# Patient Record
(demographics unavailable — no encounter records)

---

## 2017-12-29 NOTE — OP
DATE OF PROCEDURE:  12/28/2017

 

PREOPERATIVE DIAGNOSIS:  Incisional periumbilical ventral hernia.

 

POSTOPERATIVE DIAGNOSIS:  Incisional periumbilical ventral hernia.

 

OPERATION PERFORMED:  Laparoscopic repair of incisional periumbilical ventral hernia, utilizing an 8 
cm Ventralex mesh patch.

 

SURGEON:  Mohinder Gutierrez M.D.

 

ANESTHESIA:  General endotracheal.

 

INDICATIONS:  The patient is a 39-year-old white female.  She had previously had an umbilical hernia 
repaired with mesh.  She then had a laparoscopic procedure, at which time an antrectomy with Christiana-en-
Y gastrojejunostomy was performed.  She had noted several years after this that she developed a bulge
 just superior and to the right of her umbilicus at the site of her prior umbilical hernia repair.  S
he was taken to the operating room at this time for repair of this.  It has become progressively pain
ful recently.

 

DESCRIPTION OF OPERATION:  Informed consent was obtained.  The patient was taken to the operating sarah
m where general endotracheal anesthesia was obtained with the patient in supine position.  Abdomen wa
s prepped with ChloraPrep and draped in sterile fashion.  Local anesthetic was infiltrated and a 5 mm
 left mid abdominal incision was created through which a Veress needle was passed into the peritoneal
 cavity and pneumoperitoneum established using carbon dioxide up to a pressure of 15 mmHg.  A 5 mm tr
ocar port was passed through this same incision.  Laparoscopic camera was passed through this port.  
Under direct vision, 2 additional ports were placed including a 15 mm left upper quadrant port and a 
5 mm left lower quadrant port.

 

Attention was turned to the anterior abdominal wall.  There were essentially no adhesions to the ante
rior abdominal wall and the hernia was immediately visualized.  There was mesh at this location.  The
 hernia appeared to be towards the superior aspect of the mesh and the mesh was rolled up into the de
fect.  I initially performed a preperitoneal dissection, removing all of the surrounding fat from the
 underside of the fascia.  I removed some of the mesh in doing so.  The fascial edges were then caref
ully defined.  The defect was about 3 cm transversely x 2 cm vertically.  This defect was marked exte
rnally on the skin.  Hemostasis was meticulous using electrocautery.

 

The defect was closed percutaneously using 3 sutures of 0 Ethibond that were placed with a Neema nee
dle.  With the tissue fully approximated, an 8 cm Ventralex mesh patch was obtained.  Four quadrant s
utures of 0 Ethibond were placed and the mesh was moistened and passed into the abdominal cavity.  Th
e 4 stay sutures were withdrawn through the anterior abdominal wall using a GraNee needle and each wa
s secured so as to create a 4 quadrant transfascial fixation.  There was excellent coverage over the 
defect.  The patch was then secured around the circumference using the secure strap device.  Several 
straps were placed along the internal aspect of the patch as well.

 

The fascia at the left upper quadrant port site was closed with 0 Vicryl suture using a GraNee needle
.  There was more bleeding than typical from the mid abdominal port and this was also closed with an 
0 Vicryl suture.  All ports and instruments were removed under direct vision.  Pneumoperitoneum was c
arefully evacuated.  Quarter percent Marcaine with epinephrine was infiltrated in each port site and 
skin edges approximated with 4-0 Monocryl subcuticular suture.  Dermabond was placed externally.  The
re were no complications.  The patient tolerated the procedure well and was taken to recovery room in
 stable condition.

## 2018-11-05 NOTE — CT
CT ABDOMEN AND PELVIS WITH IV CONTRAST:

 

HISTORY:

Abdominal pain.  Cramping.

 

COMPARISON:

08/10/2018

 

FINDINGS:

The lung bases are clear.  The liver, spleen, kidneys, adrenal glands, and pancreas are unremarkable.
  There is a contraceptive device within the uterus.  The urinary bladder is decompressed.

 

Lack of oral contrast limits evaluation of the bowel.  Postoperative changes of the stomach are again
 demonstrated.  The stomach is now markedly distended and enlarged.  It contains fluid, gas, and a sm
all amount of particulate matter.  It measures up to 9.3 cm in length.

 

Follicles arise from the ovaries.

 

IMPRESSION:

Given the increasing distention of the enlarged stomach, gastric outlet obstruction must be of primar
y concern.

 

Other findings are stable.

 

POS: VEE

## 2018-11-06 NOTE — CON
DATE OF CONSULTATION:  11/05/2018

 

CHIEF COMPLAINT:  Nausea, vomiting, abdominal pain.

 

HISTORY OF PRESENT ILLNESS:  Ms. Mak is a 40-year-old woman who originally presented to GI clinic b
[facility] in 01/2014 with abdominal pain and nausea and vomiting.  She underwent upper endoscopy at that [facility] that showed a pyloric ulcer and pyloric stenosis.  Balloon dilation of the pyloric stenosis was pe
rformed and she did well for several months after that; however, she presented again in 01/2015 with 
nausea and vomiting and gastric outlet obstruction.  She underwent balloon dilation and then repeat b
alloon dilation; however, at this point, the stricture was noted to be very long and a large amount o
f retained vegetable matter in the stomach and the stricture tightened immediately back up after ball
oon dilation such that even following serial dilation her stomach did not empty.  She ultimately unde
rwent antrectomy with Christiana-en-Y gastrojejunostomy and vagotomy in 01/2015.  I did not see her again a
fter that until she came back to the office in 07/2018 with epigastric pain, nausea, and vomiting.  I
n the meantime, she had been diagnosed with an autoimmune disorder and was treated with steroids and 
Celebrex.  She saw Dr. Jonathan Pan over in the [place]. She was changed to leflunomide and hydroxyc
hloroquine for treatment of erythema nodosum and rheumatoid arthritis.  Her symptoms significantly im
proved with the change in her medications from the standpoint of joint pain and erythema nodosum.  In
 the meantime, I performed EGD and colonoscopy in 08/01/2018.  The upper endoscopy showed ulceration 
at the gastrojejunostomy that was inflamed and the scope could not be passed through it.  Colonoscopy
 to the terminal ileum was normal.  CT was done with contrast to better define the anatomy at the zan
stomosis and the contrast did pass through without problems.  I performed follow up EGD on 08/27/2018
 and again the gastric bypass was noted with a moderate size gastric pouch with food debris.  There w
as marked ulceration in the jejunum on the other side of the gastrojejunostomy.  The site was strictu
red, but the endoscope could be placed through to the more distal small bowel that appeared completel
y normal.  She was given again proton pump inhibitor with pantoprazole 40 mg twice daily.  She was gi
sendy a trial of Carafate as well; however, she reports that the Carafate only made her feel worse.  Sally pereyra came back to the clinic for followup and she was placed on a liquid diet.  Gastrin level was checke
d and was normal at less than 10.  She does not smoke and has not been a smoker.  She has been on no 
NSAIDs for months.  She tested negative for H. pylori previously.  Biopsies from the anastomosis show
ed ulceration with no specific neoplastic process and no H. pylori and no clear inflammatory bowel di
sease.

 

Ms. Mak presented to the emergency room this afternoon with nausea and vomiting and left upper quad
rant severe pulling or pressure type pain that radiates up towards her substernal region.  This pain 
started on Thursday and has been persistent now and she has been unable to keep anything down orally.


 

PAST MEDICAL HISTORY:

1.  Chronic gastric ulcer, ultimately treated surgically, now with recurrent ulcer at the gastrojejun
al anastomosis with gastric outlet obstruction.

2.  Rheumatoid arthritis.

3.  Erythema nodosum.

4.  Hypothyroidism.

 

PAST SURGICAL HISTORY:  Appendectomy, hernia repair, antrectomy with vagotomy and gastrojejunostomy R
oux-en-Y.

 

ALLERGIES:  SULFA, PENICILLIN, LATEX.

 

FAMILY HISTORY:  Negative for GI malignancy.

 

SOCIAL HISTORY:  Rare alcohol, no smoking, no drugs.

 

MEDICATIONS:  Prior to admission leflunomide, levothyroxine, pantoprazole 40 mg twice daily, hydroxyc
hloroquine, escitalopram, occasional acetaminophen for pain but no NSAIDs.

 

REVIEW OF SYSTEMS:  Negative x10 systems reviewed except as stated in history of present illness.

 

PHYSICAL EXAMINATION:

GENERAL:  She is in no acute distress, alert and oriented x3.

HEENT:  Eyes have no scleral icterus.

OROPHARYNX:  Clear, without lesions.

NECK:  No cervical or supraclavicular lymphadenopathy.

NEUROLOGIC:  Cranial nerves are grossly intact.

LUNGS:  Clear to auscultation bilaterally.

HEART:  Regular rate and rhythm without murmur.

ABDOMEN:  Soft, tender in the epigastric region without guarding.  Bowel sounds are present.

EXTREMITIES:  No lower extremity edema.

 

IMAGING:  She had a CT scan of the abdomen and pelvis in the emergency room that showed a distended s
tomach with evidence of gastric outlet obstruction.

 

LABORATORY AND X-RAY FINDINGS:  White blood cell count 5.3, hemoglobin is 17.3 up from baseline betwe
en 14 and 12, platelets 390, creatinine 0.85, albumin 4.5.

 

IMPRESSION:

1.  Gastric outlet obstruction secondary to chronic ulcer at the gastrojejunal anastomosis.  She prev
iously had antrectomy with vagotomy and gastrojejunostomy with Christiana-en-Y due to refractory pyloric ch
kp ulcer with gastric outlet obstruction.  She has tested negative for H. pylori.  She has been of
f NSAIDs.  She does not smoke and has not been a smoker.  Her gastrin level was normal.  She presents
 now with severe dehydration and distended stomach consistent with gastric outlet obstruction.  She h
as failed to respond to twice daily high dose proton pump inhibitor and Carafate and liquid diet.  At
 this point, I think she will require surgical revision of the gastrojejunostomy anastomosis.

2.  History of rheumatoid arthritis and erythema nodosum.  She is on leflunomide and hydroxychloroqui
ne.  Given the chronic ulcers that will not heal with no other source identified.  Inflammatory bowel
 disease of the stomach and proximal small bowel is considered as a possibility.  No obvious granulom
as were reported by previous histopathology specimens.  She had a recent colonoscopy to the terminal 
ileum which was normal.  She has had no evidence of disease distal to the pylorus previously and now 
to the anastomosis.  Certainly, she has an area high risk for anastomotic ulceration; however, still 
consider inflammatory bowel disease as a possibility.  I will send an IBD diagnostic serology to look
 for other evidence of inflammatory bowel disease; however, this will not rule in or rule out Crohn's
 as a diagnosis.  If she does have findings suggestive of inflammatory bowel disease by Serology; how
ever, I would consider treating with an anti-TNF since she is already considered this for treatment o
f her rheumatoid arthritis and erythema nodosum anyway.  She did have a flare of joint pain just last
 week, even on her current immunosuppressive regimen.

 

RECOMMENDATIONS:

1.  Place NG tube.

2.  IV fluids.

3.  Consultation with General Surgery.  I discussed her case with Dr. Gutierrez and after decompression
 of her stomach then the next step will likely be surgical revision of her gastrojejunostomy.

4.  I will send serology for IBD diagnostic markers.

## 2018-11-06 NOTE — HP
PRIMARY CARE PHYSICIAN:  Dr. Kerwin Gann.

 

REASON FOR ADMISSION:  Abdominal pain and gastric outlet obstruction.

 

HISTORY OF PRESENT ILLNESS:  This is a 40-year-old female, who has a previous history of gastric bypa
ss surgery with Christiana-en-Y; operation was done by Dr. Gutierrez about 5 years ago.  Subsequently, her co
deoe was complicated one time with gastric outlet obstruction, required surgery.  Patient has a long 
history of nausea, vomiting, abdominal pain for about 6 months, but initially symptoms were intermitt
ent and patient was trying to manage with dietary modification.  For the last 3 weeks, patient's symp
toms have gradually progressed and she was instructed to be on clear liquid diet, and now a days even
 with liquid diet patient is getting abdominal pain, which is epigastric in location, 5/10 in intensi
ty, associated with acid reflux as well as nausea and sometimes vomiting.  Patient has lost almost 30
 pounds weight over the last several years and 12 pound in the last 3 weeks.  She denies any melena o
r hematochezia.  She denies any fever or chills.  She denies any UTI symptoms.  She denies any headac
he or dizziness, but she is feeling weak and exhausted.

 

With this presentation, she came to emergency room yesterday and she had CT abdomen and pelvis, which
 showed gastric distention and gastric outlet obstruction was a concern.  This patient was already ev
aluated by Dr. Bello as well as Dr. Gutierrez and they recommended to put her NG tube and keep her n.p.
o. with a clear ice chips only.  Overnight, the patient did well.  She still has some nausea and some
 vague discomfort in her epigastric region.  She denies any chest pain.  She denies any shortness of 
breath.

 

PAST MEDICAL HISTORY:  Chronic gastric ulcer, treated with surgery with gastric bypass and Christiana-en-Y;
 history of gastrojejunal anastomosis with a gastric outlet obstruction, required surgery; rheumatoid
 arthritis; erythema nodosum; hypothyroidism; migraine.

 

PAST SURGICAL HISTORY:  Appendicectomy, hernia repair, antrectomy with vagotomy and gastrojejunostomy
 with Christiana-en-Y, tonsillectomy, bladder sling repair, bunionectomy.

 

PAST PSYCHIATRIC HISTORY:  Anxiety and depression.

 

ALLERGIES:  SULFA DRUGS, PENICILLIN, and LATEX.

 

FAMILY HISTORY:  Patient denies any family history of coronary artery disease, stroke, or cancer.

 

SOCIAL HISTORY:  Patient is  and lives at home with her .  No history of tobacco, alcoh
ol, or illicit drug abuse.

 

MEDICATIONS:  Lexapro 20 mg p.o. daily, Plaquenil 200 mg p.o. at bedtime, leflunomide 10 mg as direct
ed, levothyroxine 25 mcg p.o. daily, Protonix 40 mg p.o. b.i.d., Carafate a.c. and at bedtime.

 

REVIEW OF SYSTEMS:  The following complete review of systems was negative, unless otherwise mentioned
 in the HPI or below:  Constitutional:  Weight loss or gain, ability to conduct usual activities.  Sk
in:  Rash, itching.  Eyes:  Double vision, pain.  ENT/Mouth:  Nose bleeding, neck stiffness, pain, te
nderness.  Cardiovascular:  Palpitations, dyspnea on exertion, orthopnea.  Respiratory:  Shortness of
 breath, wheezing, cough, hemoptysis, fever, or night sweats.  Gastrointestinal:  Poor appetite, abdo
anil pain, heartburn, nausea, vomiting, constipation, or diarrhea.  Genitourinary:  Urgency, frequen
cy, dysuria, nocturia.  Musculoskeletal:  Pain, swelling.  Neurologic/Psychiatric:  Anxiety, depressi
on.  Allergy/Immunologic:  Skin rash, bleeding tendency.  Please see my HPI for pertinent positive an
d negative.  All other review of systems reviewed and negative except as mentioned in the HPI.

 

EMERGENCY ROOM COURSE:  Patient was treated with morphine, Phenergan, and IV fluid, Zofran, and Naomi
nix.

 

PHYSICAL EXAMINATION:

VITAL SIGNS:  On arrival, blood pressure 108/88, pulse 122, respiratory rate of 17, temperature 98.8,
 saturation 98% on room air.

GENERAL:  Patient is currently alert, awake, in no obvious acute distress.

HEENT:  Head:  Normocephalic, atraumatic.  Eyes:  Pupils round, reactive to light.  Extraocular muscl
e intact.  ENT:  Oropharynx within normal limits.  Moist mucous membranes, no oral lesion.  No pharyn
geal erythema, no exudate.  She has NG tube in place with low intermittent suction.

NECK:  Supple, no JVD, no thyromegaly, no carotid bruit.

LUNGS:  Clear to auscultation without any rhonchi or rales.

CARDIAC:  S1 and S2 regular, tachycardia, no murmur, no gallop, no rub.

ABDOMEN:  Soft.  Epigastric discomfort noted.  No peritoneal sign, no guarding, no rigidity, no rebou
nd.

BACK EXAMINATION:  Unremarkable, no CVA tenderness.

EXTREMITIES:  Upper extremities, passive movement of all joints are normal.  Lower extremities, no ed
ema.  Good distal pulsation, no calf tenderness.

SKIN:  No skin rash.

HEMATOLOGICAL SYSTEM:  No lymphadenopathy.

PSYCHIATRIC:  Normal affect.

NEUROLOGIC:  Nonfocal examination.

 

SIGNIFICANT LABORATORY DATA:  EKG showing sinus tachycardia.  CBC:  WBC 5.3, hemoglobin 17.3, platele
ts 390.  BMP:  Sodium 140, potassium 3.5, chloride 98, carbon dioxide 32, anion gap 14, BUN 25, creat
inine 0.85, glucose 104, calcium 9.9.  LFTs:  AST 11, ALT 7, alkaline phosphatase 98, albumin 4.5, li
pase 13.  Urinalysis unremarkable.  Pregnancy test negative.  CT of the abdomen and pelvis consistent
 with a gastric outlet obstruction and gastric distention.  X-ray abdomen showing NG tube in place.

 

ASSESSMENT AND PLAN:

1.  Nausea, vomiting, abdominal pain in epigastric region due to gastric outlet obstruction.

2.  Dehydration.

3.  Metabolic alkalosis due to recurrent nausea and vomiting.

4.  Anxiety and depression.

5.  Rheumatoid arthritis.

6.  Hypothyroidism.

7.  History of chronic peptic ulcer disease, required a gastrojejunostomy with Christiana-en-Y surgery.

 

PLAN:  Admission to medical floor.  Gastroenterology consult and general surgery consult.  We will co
ntinue with Protonix 40 mg IV b.i.d.  We will continue with dextrose with half normal saline NS at 12
5 mL per hour for hydration.  Deep venous thrombosis prophylaxis with Lovenox 40 mg subcutaneously da
jim.  Gastrointestinal prophylaxis with Protonix.

 

CODE STATUS:  The patient is FULL CODE.  Patient's  is surrogate decision maker.

 

Disposition plan based on clinical course and consultant recommendation.

 

At this point, the patient has NG tube with low intermittent suction on ice chips only.  Depending up
on clinical course, we will advance diet.  She may need a surgical evaluation versus endoscopic evalu
ation.

 

The patient will stay in hospital more than 2 midnights.  Plan of care discussed with the patient in 
detail.

## 2018-11-06 NOTE — RAD
ABDOMEN ONE VIEW:

 

History: 

40-year-old female with history of follow up gastric outlet obstruction. 

 

FINDINGS: 

Extensive post-operative changes are noted. There is some gas within a somewhat dilated stomach. An N
G tube has been placed. The tip extends into the stomach. The sidehole is at the GE junction region a
nd this could be advanced somewhat to completely allow the sidehole to enter the stomach. There is le
ss distention than on the CT of 11-5-18. 

 

IMPRESSION: 

NG tube in place with some persistent dilatation and distention of the stomach, the sidehole is at th
e GE junction region and the NG tube could be advanced so that it more completely enters the stomach.
 Extensive post-operative changes. Continue short term follow up. 

 

POS: VEE

## 2018-11-07 NOTE — PDOC.PN
- Subjective


Encounter Start Date: 11/07/18


Encounter Start Time: 08:30





Patient seen and examined. No new complaints. No overnight events





today NG tube came out





- Objective


Resuscitation Status: 


 











Resuscitation Status           FULL:Full Resuscitation














MAR Reviewed: Yes


Vital Signs & Weight: 


 Vital Signs (12 hours)











  Temp Pulse Resp BP Pulse Ox


 


 11/07/18 08:48  98.1 F  95  16  109/63  99


 


 11/07/18 05:00  98.3 F  84  18  121/68 


 


 11/07/18 00:59  98.1 F  87  18  105/58 L 








 Weight











Admit Weight                   126 lb 12.253 oz


 


Weight                         126 lb 12.253 oz














I&O: 


 











 11/06/18 11/07/18 11/08/18





 06:59 06:59 06:59


 


Intake Total 1192 1228 


 


Output Total 375 1200 


 


Balance 817 28 











Result Diagrams: 


 11/07/18 05:31





 11/07/18 05:31





Phys Exam





- Physical Examination


Constitutional: NAD


HEENT: PERRLA, moist MMs, sclera anicteric


Neck: no JVD, supple


Respiratory: no wheezing, no rales, no rhonchi


Cardiovascular: RRR, no significant murmur, no rub


Gastrointestinal: soft, no distention, positive bowel sounds


epigastric discomfort


Musculoskeletal: no edema, pulses present


Neurological: non-focal, normal sensation, moves all 4 limbs


Psychiatric: normal affect, A&O x 3


Skin: no rash, normal turgor





Dx/Plan


(1) Dehydration


Code(s): E86.0 - DEHYDRATION   Status: Acute   





(2) Epigastric abdominal pain


Code(s): R10.13 - EPIGASTRIC PAIN   Status: Acute   





(3) Gastric distention


Code(s): K31.89 - OTHER DISEASES OF STOMACH AND DUODENUM   Status: Acute   





(4) Gastric outlet obstruction


Code(s): K31.1 - ADULT HYPERTROPHIC PYLORIC STENOSIS   Status: Acute   





(5) Metabolic alkalosis


Code(s): E87.3 - ALKALOSIS   Status: Acute   





(6) Nausea & vomiting


Code(s): R11.2 - NAUSEA WITH VOMITING, UNSPECIFIED   Status: Acute   





(7) Anxiety and depression


Code(s): F41.9 - ANXIETY DISORDER, UNSPECIFIED; F32.9 - MAJOR DEPRESSIVE 

DISORDER, SINGLE EPISODE, UNSPECIFIED   Status: Chronic   





(8) Chronic low back pain


Code(s): M54.5 - LOW BACK PAIN; G89.29 - OTHER CHRONIC PAIN   Status: Chronic   





(9) H/O gastric bypass


Code(s): Z98.84 - BARIATRIC SURGERY STATUS   Status: Chronic   





(10) H/O peptic ulcer


Code(s): Z87.11 - PERSONAL HISTORY OF PEPTIC ULCER DISEASE   Status: Chronic   





(11) Hypothyroidism


Code(s): E03.9 - HYPOTHYROIDISM, UNSPECIFIED   Status: Chronic   





(12) Rheumatoid arthritis


Code(s): M06.9 - RHEUMATOID ARTHRITIS, UNSPECIFIED   Status: Chronic   





- Plan


cont current plan of care





* she will need repeat NG tube placement and then LIS


* tomorrow she is planned for surgery


* continue IVF


* medication reviewed as below


* symptomatic treatment.








Review of Systems





- Review of Systems


ENT: negative: Ear Pain, Ear Discharge, Nose Pain, Nose Discharge, Nose 

Congestion, Mouth Pain, Mouth Swelling, Throat Pain, Throat Swelling, Other


Respiratory: negative: Cough, Dry, Shortness of Breath, Hemoptysis, SOB with 

Excertion, Pleuritic Pain, Sputum, Wheezing


Cardiovascular: negative: chest pain, palpitations, orthopnea, paroxysmal 

nocturnal dyspnea, edema, light headedness, other


Gastrointestinal: Nausea, Abdominal Pain.  negative: Vomiting, Diarrhea, 

Constipation, Melena, Hematochezia, Other


Genitourinary: negative: Dysuria, Frequency, Incontinence, Hematuria, Retention

, Other


Musculoskeletal: negative: Neck Pain, Shoulder Pain, Arm Pain, Back Pain, Hand 

Pain, Leg Pain, Foot Pain, Other


Skin: negative: Rash, Lesions, David, Bruising, Other





- Medications/Allergies


Allergies/Adverse Reactions: 


 Allergies











Allergy/AdvReac Type Severity Reaction Status Date / Time


 


adhesive tape Allergy   Verified 12/27/17 10:32


 


Penicillins Allergy   Verified 12/27/17 10:32


 


Sulfa (Sulfonamide Allergy   Verified 12/27/17 10:32





Antibiotics)     











Medications: 


 Current Medications





Acetaminophen (Tylenol)  650 mg SC Q4H PRN


   PRN Reason: Headache/Fever/Mild Pain (1-3)


Artificial Tears (Tears Naturale)  2 drop EA EYE PRN PRN


   PRN Reason: Dry Eyes


Bisacodyl (Dulcolax)  10 mg SC DAILYPRN PRN


   PRN Reason: Constipation


Enoxaparin Sodium (Lovenox)  40 mg SC 0900 [facility]


   Last Admin: 11/06/18 11:49 Dose:  40 mg


Hydralazine HCl (Apresoline)  10 mg SLOW IVP Q4H PRN


   PRN Reason: SBP Greater Than 170


Potassium Chloride/Dextrose/Sod Cl (D5 1/2 Ns W/20 Meq Kcl)  1,000 mls @ 125 mls

/hr IV .Q8H [facility]


   Last Admin: 11/07/18 06:04 Dose:  1,000 mls


Acetaminophen 650 mg/ Device  65 mls @ 260 mls/hr IVPB Q6H PRN


   PRN Reason: Fever/Mild Pain


   Stop: 11/07/18 22:56


Mineral Oil/White Petrolatum (Eucerin Cream)  0 gm TOP BIDPRN PRN


   PRN Reason: Dry Skin


Morphine Sulfate (Morphine)  2 mg SLOW IVP Q4H PRN


   PRN Reason: Moderate Pain (4-6)


   Last Admin: 11/06/18 17:52 Dose:  2 mg


Ondansetron HCl (Zofran)  4 mg IVP Q6H PRN


   PRN Reason: Nausea/Vomiting


   Last Admin: 11/06/18 22:49 Dose:  4 mg


Pantoprazole Sodium (Protonix)  40 mg IVP Q12HR [facility]


   Last Admin: 11/06/18 20:33 Dose:  40 mg


Sodium Chloride (Flush - Normal Saline)  10 ml IVF Q12HR [facility]


   Last Admin: 11/06/18 20:33 Dose:  10 ml


Sodium Chloride (Flush - Normal Saline)  10 ml IVF PRN PRN


   PRN Reason: Saline Flush


Sodium Chloride (Ocean Nasal Spray 0.65%)  0 ml EA NARE QIDPRN PRN


   PRN Reason: Nasal Congestion


Throat Lozenges (Cepastat Lozenges)  1 yusra PO Q2H PRN


   PRN Reason: Sore Throat

## 2018-11-07 NOTE — CON
DATE OF CONSULTATION:  11/06/2018

 

CHIEF COMPLAINT:  Gastric outlet obstruction with nausea and vomiting.

 

HISTORY OF PRESENT ILLNESS:  The patient is a 40-year-old pleasant white female.  In 01/2015, I perfo
rmed a laparoscopic distal gastrectomy with Christiana-en-Y gastrojejunostomy.  I also performed a truncal 
vagotomy at that time.  She had gastric outlet obstruction secondary to pyloric scarring from peptic 
ulcer disease.  She did well after the surgery and has had no subsequent gastric problems for couple 
of years.  She had a flare up of an autoimmune disease for which she was placed on significant doses 
of steroids and nonsteroidal medications.  At that point, she began having recurrent problems with he
r stomach.  She and her  note that over the past 6 months that she has had intermittent proble
ms with nausea, vomiting, and abdominal pain.  She has undergone few endoscopies per Dr. Bello.  She 
is noted to have recurrent problems with ulceration, stenosis at her gastrojejunostomy.  Gastrin leve
ls were checked and found to be essentially normal.

 

She presented to the emergency room yesterday with worsened problems with pain and nausea and vomitin
g.  She and her  both note that her quality of life over the past several months has been very
 poor.  CT scan obtained in the emergency room document, substantial gastric distention with no other
 intra-abdominal abnormality.

 

PAST MEDICAL HISTORY:

1.  History of gastric outlet obstruction.

2.  Rheumatoid arthritis.

3.  Erythema nodosum.

4.  Hypothyroidism.

5.  History of migraine headaches.

 

PAST SURGICAL HISTORY:

1.  Appendectomy.

2.  Umbilical hernia repair

3.  Antrectomy with vagotomy and gastrojejunostomy in 2015.

4.  Tonsillectomy.

5.  Bladder sling repair.

6.  Bunionectomy.

7.  Laparoscopic ventral hernia repair that I performed last year.

 

ALLERGIES:  SULFA DRUGS, PENICILLIN, LATEX.

 

PERSONAL AND SOCIAL HISTORY:  She is  with 2 children.  She is a teacher.  She lives in [facility].  She denies tobacco or alcohol use.

 

MEDICATIONS:  Lexapro, Plaquenil, leflunomide, levothyroxine, Protonix, Carafate.

 

REVIEW OF SYSTEMS:  Otherwise, unremarkable.

 

FAMILY HISTORY:  Noncontributory.

 

PHYSICAL EXAMINATION:

VITAL SIGNS:  She is afebrile with tachycardia upon arrival with a heart rate of 122.  Her tachycardi
a is largely resolved with heart rate in the 80s.  Blood pressure is within normal limits.

GENERAL:  She is a well-developed, well-nourished, thin white female resting in bed in no acute distr
ess.  She is alert and oriented x3.

HEAD, EYES, EARS, NOSE AND THROAT:  Unremarkable.

NECK:  Supple, without mass or tenderness.

LUNGS:  Clear to auscultation throughout.

CARDIAC:  Regular rate and rhythm without murmur.

ABDOMEN:  Soft, nontender, nondistended with well healed incisions from prior surgery.

EXTREMITIES:  Unremarkable.

 

LABORATORY DATA:  CBC reveals white blood cell count of 5.3, hemoglobin of 17.3.  Chemistry profile r
eveals no significant electrolyte abnormality.  Her CO2 is little elevated at 32, BUN and creatinine 
are normal.  Albumin level is 4.5.

 

ASSESSMENT:  The patient with substantial gastric outlet obstruction with a very distended stomach.  
At last surgery, her stomach was very thickened secondary to the chronic obstruction.  For now, there
fore, recommend placement of nasogastric tube allow the stomach to decompress for about 48 hours.  Af
ter 48 hours of decompression, I believe her stomach will be well suited to repeat operation.  I will
 plan on revising her gastrojejunostomy laparoscopic cholecystectomy.  I discussed this operation wit
h the patient and her .  They understand and agree to proceed with surgery.

## 2018-11-07 NOTE — PRG
DATE OF SERVICE:  11/07/2018

 

SUBJECTIVE:  Ms. Mak is uncomfortable from her NG tube.  She did have a few 100 mL out from her NG 
tube, but most of this was fluid used to flush the tube.  She has not had ongoing pain in her abdomen
 or vomiting with the tube in place.

 

OBJECTIVE:

VITAL SIGNS:  Temperature 98.3, pulse 89, blood pressure 143/87.

GENERAL:  She is in no acute distress.  She is awake and alert.

LUNGS:  Clear to auscultation bilaterally.

HEART:  Regular rate and rhythm.

ABDOMEN:  Soft, nontender, nondistended.  Bowel sounds are present.

EXTREMITIES:  No lower extremity edema.

 

IMPRESSION:

1.  Gastrojejunal anastomosis ulcer with gastric outlet obstruction.

2.  Severe dehydration, improved with fluids.  Her hemoglobin has decreased from 17.3-12.5.

 

PLAN:

1.  She remains on NG suction and we will proceed with surgical revision of her anastomosis tomorrow 
by Dr. Gutierrez.

2.  [facility] and are [facility].  She does have a history of erythema no
dosum and rheumatoid arthritis and has been on immunosuppression for that.  If the genetic and antibi
otic markers came back suggestive of a higher probability of Crohn's, then I would treat her with a b
iologic.

## 2018-11-07 NOTE — RAD
AP ABDOMINAL RADIOGRAPH

11/7/18

 

HISTORY: 

Evaluate nasogastric tube placement. 

 

COMPARISON:  

11/6/18.

 

FINDINGS:  

There has been interval placement of a nasogastric tube with tip overlying the expected location of t
he proximal body of the stomach. Radiopaque suture material and surgical clips again overlie the left
 abdomen. Bowel gas pattern is nonspecific. The visualized lung bases are clear. No other interval ch
stephanie from prior study. 

 

The lower pelvis is not imaged. No other interval change.

 

IMPRESSION:  

1.      Interval placement of nasogastric tube with tip overlying the expected location of the proxim
al body of the stomach. 

2.      Postsurgical changes of the abdomen.

 

POS: CARMENZAC

## 2018-11-07 NOTE — PQF
SUZE ELLIS, MAGGY EISENBERG MD

[id number]                                                             [id number]

[id number]                             

                                   

CLINICAL DOCUMENTATION IMPROVEMENT CLARIFICATION FORM:  ICD-10 Updated



PLEASE DO AN ADDENDUM TO THE PROGRESS NOTE WITH ANY DOCUMENTATION UPDATES OR 
ADDITIONS AND CARRY THROUGH TO DC SUMMARY.   THANK YOU.



Date:   11-7-18                                                  ATTN:  DR. JAVED



Please exercise your independent, professional judgment in responding to the 
clarification form. 

Clinical indicators are provided on the bottom of this form for your review



Please check appropriate box(s):



[ X ] Protein Calorie Malnutrition:    [ X ] Mild      [  ] Moderate   

[  ] Other Malnutrition (please specify) _______________________________________
__

[  ] Underweight without malnutrition

[  ] Other diagnosis ___________

[  ] Unable to determine





CLINICAL INDICATORS - SIGNS / SYMPTOMS / LABS



BMI 21.1



H&P (TELLO): 

  LOST ALMOST 30 LBS OVER THE LAST SEVERAL YEARS AND 12 LBS IN THE LAST 3 WEEKS

  LONG HX OF NAUSEA, VOMITING, ABD PAIN FOR 6 MONTHS



11-6 DIETARY CONSULT: 

   GASTRIC OUTLET OBSTRUCTION 

   patient following a clear liquid diet x2 weeks PTA, no PO intake x5 days, 9% 
weight loss in 2 weeks



RISK FACTORS

H&P (TELLO): 

  HX GASTRIC BYPASS SURGERY W/ KENRICK-EN-Y

  GASTRIC OUTLET OBSTRUCTION

  LONG HX OF NAUSEA, VOMITING, ABD PAIN FOR 6 MONTHS



TREATMENT:

11-6 DIETARY CONSULT:

1. Continue NPO status

2. Recommend initiating a custom TPN of D30 (850 mL) + AA15 (400 mL) + 20% 
Lipids (200 mL), infusing entire bag over 24 hours

3. D/C D5 once TPN is at goal

4. Increase AA15 to 575 mL after surgery

4. When medically appropriate, recommend a ADAT to a Fiber Restricted diet





Moderate Malnutrition (in acute illness)

Energy Intake: <75% of estimated energy requirement for > 7 days

Weight Loss:  1-2%/1 week;  5%/ 1 month; 7.5%/3 months

Other: mild body fat loss; mild muscle mass loss; mild fluid accumulation; 



Severe Malnutrition (in acute illness)

Energy Intake: < 50% of estimated energy requirement for > 5 days

Weight Loss: >1-2%/1 week; >5%/1 month; >7.5%/3 months

Other: moderate body fat loss; moderate muscle mass loss; moderate- severe 
fluid accumulation; measurably reduced  strength



Moderate Malnutrition (in chronic illness)

Energy Intake: <75% of estimated energy requirement for >1 month

Weight Loss: 5%/1 month; 7.5%/3 months; 10%/6 months; 20%/1 year

Other: mild body fat loss; mild muscle mass loss; mild fluid accumulation



Severe Malnutrition (in chronic illness)

Energy Intake: <75% of estimated energy requirement for >1 month

Weight Loss: >5%/1 month; >7.5%/3 months; >10%/6 months; >20%/1 year

Other: severe body fat loss; severe muscle mass loss; severe fluid accumulation
; measurably reduced  strength





THANK YOU,

KELL



(This form is maintained as a part of the permanent medical record)

 2015 [email or web address].  All Rights Reserved

Kell Colby RN, BS    [email or web address]    Cell Phone:  [phone]

                                                              

 

[facility]

## 2018-11-07 NOTE — PRG
DATE OF SERVICE:  11/06/2018

 

SUBJECTIVE:  Aubree feels better after having the NG tube decompress her stomach.  She had a large vol
ume of fluid removed immediately upon placement.  Now, she is uncomfortable with the NG tube, but she
 has no abdominal pain currently.  She has been taking Tylenol IV and morphine for pain in general.

 

OBJECTIVE:

VITAL SIGNS:  Temperature 99.1, pulse 92, blood pressure 124/61.

GENERAL:  She is in no acute distress.  She does appear uncomfortable with NG tube in place.

LUNGS:  Clear to auscultation bilaterally.

HEART:  Regular rate and rhythm.

ABDOMEN:  Soft, nontender, nondistended.  Bowel sounds are present.

EXTREMITIES:  No lower extremity edema.

 

IMPRESSION:  Gastric outlet obstruction secondary to peptic ulcer at the gastrojejunostomy anastomosi
s, but more so on the small bowel side of the anastomosis.

 

RECOMMENDATIONS:

1.  She has an NG tube in place to low intermittent suction.

2.  Plan is for surgical revision of the anastomosis on Thursday.

## 2018-11-08 NOTE — PDOC.PN
- Subjective


Encounter Start Date: 11/08/18


Encounter Start Time: 07:35





Patient seen and examined. No new complaints. No overnight events





- Objective


Resuscitation Status: 


 











Resuscitation Status           FULL:Full Resuscitation














MAR Reviewed: Yes


Vital Signs & Weight: 


 Vital Signs (12 hours)











  Temp Pulse Resp BP Pulse Ox


 


 11/08/18 07:19  97.5 F L  76  18  134/83  100


 


 11/08/18 04:05  97.5 F L  74  19  130/86  98


 


 11/08/18 01:04  97.6 F  82  22 H  146/93 H  97








 Weight











Admit Weight                   126 lb 12.253 oz


 


Weight                         126 lb 12.253 oz














I&O: 


 











 11/07/18 11/08/18 11/09/18





 06:59 06:59 06:59


 


Intake Total 1228 1660 


 


Output Total 1200 560 


 


Balance 28 1100 











Result Diagrams: 


 11/08/18 04:59





 11/08/18 04:59





Phys Exam





- Physical Examination


Constitutional: NAD


HEENT: PERRLA, moist MMs, sclera anicteric


NG tube with LIS


Neck: no JVD, supple


Respiratory: no wheezing, no rales, no rhonchi


Cardiovascular: RRR, no significant murmur, no rub


Gastrointestinal: soft, non-tender, no distention, positive bowel sounds


Musculoskeletal: no edema, pulses present


Neurological: non-focal, normal sensation, moves all 4 limbs


Psychiatric: normal affect, A&O x 3


Skin: no rash, normal turgor





Dx/Plan


(1) Epigastric abdominal pain


Code(s): R10.13 - EPIGASTRIC PAIN   Status: Acute   





(2) Gastric distention


Code(s): K31.89 - OTHER DISEASES OF STOMACH AND DUODENUM   Status: Acute   





(3) Gastric outlet obstruction


Code(s): K31.1 - ADULT HYPERTROPHIC PYLORIC STENOSIS   Status: Acute   





(4) Dehydration


Code(s): E86.0 - DEHYDRATION   Status: Resolved   





(5) Metabolic alkalosis


Code(s): E87.3 - ALKALOSIS   Status: Acute   





(6) Nausea & vomiting


Code(s): R11.2 - NAUSEA WITH VOMITING, UNSPECIFIED   Status: Resolved   





(7) Anxiety and depression


Code(s): F41.9 - ANXIETY DISORDER, UNSPECIFIED; F32.9 - MAJOR DEPRESSIVE 

DISORDER, SINGLE EPISODE, UNSPECIFIED   Status: Chronic   





(8) Chronic low back pain


Code(s): M54.5 - LOW BACK PAIN; G89.29 - OTHER CHRONIC PAIN   Status: Chronic   





(9) H/O gastric bypass


Code(s): Z98.84 - BARIATRIC SURGERY STATUS   Status: Chronic   





(10) H/O peptic ulcer


Code(s): Z87.11 - PERSONAL HISTORY OF PEPTIC ULCER DISEASE   Status: Chronic   





(11) Hypothyroidism


Code(s): E03.9 - HYPOTHYROIDISM, UNSPECIFIED   Status: Chronic   





(12) Rheumatoid arthritis


Code(s): M06.9 - RHEUMATOID ARTHRITIS, UNSPECIFIED   Status: Chronic   





- Plan


cont current plan of care, plan discussed w/ family





* medication reviewed as below


* symptomatic treatment


* today afternoon plan for surgery


* discussed with  bedside


* continue NG tube with LIS


* continue IVF.








Review of Systems





- Review of Systems


ENT: negative: Ear Pain, Ear Discharge, Nose Pain, Nose Discharge, Nose 

Congestion, Mouth Pain, Mouth Swelling, Throat Pain, Throat Swelling, Other


Respiratory: negative: Cough, Dry, Shortness of Breath, Hemoptysis, SOB with 

Excertion, Pleuritic Pain, Sputum, Wheezing


Cardiovascular: negative: chest pain, palpitations, orthopnea, paroxysmal 

nocturnal dyspnea, edema, light headedness, other


Gastrointestinal: negative: Nausea, Vomiting, Abdominal Pain, Diarrhea, 

Constipation, Melena, Hematochezia, Other


Genitourinary: negative: Dysuria, Frequency, Incontinence, Hematuria, Retention

, Other


Musculoskeletal: negative: Neck Pain, Shoulder Pain, Arm Pain, Back Pain, Hand 

Pain, Leg Pain, Foot Pain, Other





- Medications/Allergies


Allergies/Adverse Reactions: 


 Allergies











Allergy/AdvReac Type Severity Reaction Status Date / Time


 


adhesive tape Allergy   Verified 12/27/17 10:32


 


Penicillins Allergy   Verified 12/27/17 10:32


 


Sulfa (Sulfonamide Allergy   Verified 12/27/17 10:32





Antibiotics)     











Medications: 


 Current Medications





Acetaminophen (Tylenol)  650 mg [place] Q4H PRN


   PRN Reason: Headache/Fever/Mild Pain (1-3)


Artificial Tears (Tears Naturale)  2 drop EA EYE PRN PRN


   PRN Reason: Dry Eyes


Bisacodyl (Dulcolax)  10 mg NJ DAILYPRN PRN


   PRN Reason: Constipation


Diphenhydramine HCl (Benadryl)  12.5 mg IVP ONE PRN


   PRN Reason: Itching


   Stop: 11/08/18 23:33


Enoxaparin Sodium (Lovenox)  40 mg SC 0900 [facility]


   Last Admin: 11/07/18 09:40 Dose:  40 mg


Hydralazine HCl (Apresoline)  10 mg SLOW IVP Q4H PRN


   PRN Reason: SBP Greater Than 170


Potassium Chloride/Dextrose/Sod Cl (D5 1/2 Ns W/20 Meq Kcl)  1,000 mls @ 125 mls

/hr IV .Q8H [facility]


   Last Admin: 11/08/18 08:22 Dose:  1,000 mls


Acetaminophen 1,000 mg/ Device  100 mls @ 400 mls/hr IVPB ONCALL-OR OREN


   Stop: 11/08/18 15:00


Cefoxitin Sodium 2 gm/ Sodium (Chloride)  100 mls @ 100 mls/hr IVPB ONCALL-OR 

[facility]


   Stop: 11/08/18 15:00


Acetaminophen 650 mg/ Device  65 mls @ 400 mls/hr IVPB Q6H PRN


   PRN Reason: PAIN 1-3


   Stop: 11/08/18 23:36


   Last Admin: 11/08/18 00:53 Dose:  65 mls


Ketorolac Tromethamine (Toradol)  30 mg IVP ONCALL-OR OREN


   Stop: 11/08/18 15:00


Mineral Oil/White Petrolatum (Eucerin Cream)  0 gm TOP BIDPRN PRN


   PRN Reason: Dry Skin


Morphine Sulfate (Morphine)  2 mg SLOW IVP Q2H PRN


   PRN Reason: Pain


   Last Admin: 11/08/18 08:12 Dose:  2 mg


Ondansetron HCl (Zofran)  4 mg IVP Q6H PRN


   PRN Reason: Nausea/Vomiting


   Last Admin: 11/07/18 12:25 Dose:  4 mg


Oxymetazoline HCl (Oxymetazoline Hcl)  2 sprays NASAL BID [facility]


   Last Admin: 11/07/18 20:12 Dose:  Not Given


Pantoprazole Sodium (Protonix)  40 mg IVP Q12HR [facility]


   Last Admin: 11/08/18 08:12 Dose:  40 mg


Sodium Chloride (Flush - Normal Saline)  10 ml IVF Q12HR [facility]


   Last Admin: 11/07/18 20:12 Dose:  10 ml


Sodium Chloride (Flush - Normal Saline)  10 ml IVF PRN PRN


   PRN Reason: Saline Flush


Sodium Chloride (Ocean Nasal Spray 0.65%)  0 ml EA NARE QIDPRN PRN


   PRN Reason: Nasal Congestion


Throat Lozenges (Cepastat Lozenges)  1 yusra PO Q2H PRN


   PRN Reason: Sore Throat

## 2018-11-09 NOTE — PDOC.PN
- Subjective


Encounter Start Date: 11/09/18


Encounter Start Time: 07:30





Patient seen and examined. No new complaints. No overnight events





- Objective


Resuscitation Status: 


 











Resuscitation Status           FULL:Full Resuscitation














MAR Reviewed: Yes


Vital Signs & Weight: 


 Vital Signs (12 hours)











  Temp Pulse Resp BP Pulse Ox


 


 11/09/18 07:37  97.7 F  100  20  130/87  99


 


 11/09/18 00:08  99 F  95  20  136/84  97


 


 11/08/18 21:00      96








 Weight











Admit Weight                   126 lb 12.253 oz


 


Weight                         126 lb 12.253 oz














I&O: 


 











 11/08/18 11/09/18 11/10/18





 06:59 06:59 06:59


 


Intake Total 1660 2125 


 


Output Total 560 70 


 


Balance 1100 2055 











Result Diagrams: 


 11/09/18 05:11





 11/09/18 05:11





Phys Exam





- Physical Examination


Constitutional: NAD


HEENT: PERRLA, moist MMs, sclera anicteric


Neck: no JVD, supple


Respiratory: no wheezing, no rales, no rhonchi


Cardiovascular: RRR, no significant murmur, no rub


Gastrointestinal: soft, no distention, positive bowel sounds


Musculoskeletal: no edema, pulses present


Neurological: non-focal, normal sensation, moves all 4 limbs


Psychiatric: normal affect, A&O x 3


Skin: no rash, normal turgor





Dx/Plan


(1) Epigastric abdominal pain


Code(s): R10.13 - EPIGASTRIC PAIN   Status: Acute   





(2) Gastric distention


Code(s): K31.89 - OTHER DISEASES OF STOMACH AND DUODENUM   Status: Acute   





(3) Gastric outlet obstruction


Code(s): K31.1 - ADULT HYPERTROPHIC PYLORIC STENOSIS   Status: Acute   





(4) Dehydration


Code(s): E86.0 - DEHYDRATION   Status: Resolved   





(5) Metabolic alkalosis


Code(s): E87.3 - ALKALOSIS   Status: Acute   





(6) Nausea & vomiting


Code(s): R11.2 - NAUSEA WITH VOMITING, UNSPECIFIED   Status: Resolved   





(7) Anxiety and depression


Code(s): F41.9 - ANXIETY DISORDER, UNSPECIFIED; F32.9 - MAJOR DEPRESSIVE 

DISORDER, SINGLE EPISODE, UNSPECIFIED   Status: Chronic   





(8) Chronic low back pain


Code(s): M54.5 - LOW BACK PAIN; G89.29 - OTHER CHRONIC PAIN   Status: Chronic   





(9) H/O gastric bypass


Code(s): Z98.84 - BARIATRIC SURGERY STATUS   Status: Chronic   





(10) H/O peptic ulcer


Code(s): Z87.11 - PERSONAL HISTORY OF PEPTIC ULCER DISEASE   Status: Chronic   





(11) Hypothyroidism


Code(s): E03.9 - HYPOTHYROIDISM, UNSPECIFIED   Status: Chronic   





(12) Rheumatoid arthritis


Code(s): M06.9 - RHEUMATOID ARTHRITIS, UNSPECIFIED   Status: Chronic   





- Plan


cont current plan of care, plan discussed w/ family, incentive spirometry





* continue post operative care as per surgeon


* diet as per surgeon


* drain care


* pain control


* medication reviewed as below


* symptomatic treatment


* discussed with .








Review of Systems





- Review of Systems


ENT: negative: Ear Pain, Ear Discharge, Nose Pain, Nose Discharge, Nose 

Congestion, Mouth Pain, Mouth Swelling, Throat Pain, Throat Swelling, Other


Respiratory: negative: Cough, Dry, Shortness of Breath, Hemoptysis, SOB with 

Excertion, Pleuritic Pain, Sputum, Wheezing


Cardiovascular: negative: chest pain, palpitations, orthopnea, paroxysmal 

nocturnal dyspnea, edema, light headedness, other


Gastrointestinal: negative: Nausea, Vomiting, Abdominal Pain, Diarrhea, 

Constipation, Melena, Hematochezia, Other


Genitourinary: negative: Dysuria, Frequency, Incontinence, Hematuria, Retention

, Other


Musculoskeletal: negative: Neck Pain, Shoulder Pain, Arm Pain, Back Pain, Hand 

Pain, Leg Pain, Foot Pain, Other


Skin: negative: Rash, Lesions, David, Bruising, Other





- Medications/Allergies


Allergies/Adverse Reactions: 


 Allergies











Allergy/AdvReac Type Severity Reaction Status Date / Time


 


adhesive tape Allergy   Verified 12/27/17 10:32


 


Penicillins Allergy   Verified 12/27/17 10:32


 


Sulfa (Sulfonamide Allergy   Verified 12/27/17 10:32





Antibiotics)     











Medications: 


 Current Medications





Acetaminophen (Tylenol)  650 mg LA Q4H PRN


   PRN Reason: Headache/Fever/Mild Pain (1-3)


Artificial Tears (Tears Naturale)  2 drop EA EYE PRN PRN


   PRN Reason: Dry Eyes


Bisacodyl (Dulcolax)  10 mg LA DAILYPRN PRN


   PRN Reason: Constipation


Enoxaparin Sodium (Lovenox)  40 mg SC 0900 [facility]


   Last Admin: 11/08/18 11:41 Dose:  Not Given


Hydralazine HCl (Apresoline)  10 mg SLOW IVP Q4H PRN


   PRN Reason: SBP Greater Than 170


Potassium Chloride/Dextrose/Sod Cl (D5 1/2 Ns W/20 Meq Kcl)  1,000 mls @ 125 mls

/hr IV .Q8H [facility]


   Last Admin: 11/09/18 00:51 Dose:  1,000 mls


Mineral Oil/White Petrolatum (Eucerin Cream)  0 gm TOP BIDPRN PRN


   PRN Reason: Dry Skin


Morphine Sulfate (Morphine)  2 mg SLOW IVP Q2H PRN


   PRN Reason: Pain


   Last Admin: 11/09/18 04:01 Dose:  2 mg


Morphine Sulfate (Morphine)  4 mg SLOW IVP Q2H PRN


   PRN Reason: Pain


Ondansetron HCl (Zofran)  4 mg IVP Q6H PRN


   PRN Reason: Nausea/Vomiting


   Last Admin: 11/07/18 12:25 Dose:  4 mg


Pantoprazole Sodium (Protonix)  40 mg IVP Q12HR [facility]


   Last Admin: 11/08/18 21:50 Dose:  40 mg


Sodium Chloride (Flush - Normal Saline)  10 ml IVF Q12HR [facility]


   Last Admin: 11/08/18 21:51 Dose:  10 ml


Sodium Chloride (Flush - Normal Saline)  10 ml IVF PRN PRN


   PRN Reason: Saline Flush


Sodium Chloride (Ocean Nasal Spray 0.65%)  0 ml EA NARE QIDPRN PRN


   PRN Reason: Nasal Congestion


Throat Lozenges (Cepastat Lozenges)  1 yusra PO Q2H PRN


   PRN Reason: Sore Throat

## 2018-11-09 NOTE — PRG
DATE OF SERVICE:  11/09/2018

 

SUBJECTIVE:  Ms. Mak is doing well this evening.  She has had 3 trays of clear liquid diet and keep
ing that down.  She has no significant pain currently.  She did have some pain this morning.  She has
 been passing flatus.

 

OBJECTIVE:

VITAL SIGNS:  Temperature 98.0, pulse 98, blood pressure 114/78.

GENERAL:  She is in no acute distress.  She is alert and oriented x3.

LUNGS:  Clear to auscultation bilaterally.

HEART:  Regular rate and rhythm without murmur.

ABDOMEN:  Soft.  She has tenderness diffusely a little less so in the lower abdomen.  Bowel sounds ar
e present.

EXTREMITIES:  No lower extremity edema.

 

LABORATORY DATA:  White blood cell count 13.3, hemoglobin 14.0, platelets 332, creatinine is 0.73.

 

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 
revision which was done yesterday.  She is already doing clinically significantly better.

 

RECOMMENDATIONS:

1.  She will advance her diet as advised by General Surgery.

2.  We will await the [facility] inflammatory bowel disease serology.

3.  Proton pump inhibitor.

## 2018-11-09 NOTE — PRG
DATE OF SERVICE:  11/09/2018

 

SUBJECTIVE:  Ms. Mak is postoperative day number 1 from laparoscopic revision of her gastrojejunost
[place].  I excised her prior anastomosis and performed another gastrojejunostomy laparoscopically.  Her 
nasogastric tube was left out after surgery.  She tells me that in general she has been doing well.  
She does have some right-sided abdominal pain at the site of her dominant laparoscopic port site.  Sally pereyra has been tolerating sips of clear liquids without nausea or vomiting.  She is urinating, but has no
t had flatus or bowel movement.

 

PHYSICAL EXAMINATION:

VITAL SIGNS:  She is afebrile with a temperature of 97.4, her pulse is between 95 and 100, blood pres
sure is 115/78.

LUNGS:  Clear to auscultation.

CARDIAC:  Regular rate and rhythm.

ABDOMEN:  Soft.  Bowel sounds are present, but very hypoactive.  Incisions are healing nicely.

 

LABORATORY STUDIES:  White blood cell count is elevated at 13.3 with a hemoglobin of 14.0.  Electroly
flaco are normal.  Glucose is slightly elevated at 129.  Her drain output was 70 mL overnight.

 

ASSESSMENT AND PLAN:  The patient is postoperative day number 1 from gastrojejunostomy revision.  She
 had a functional gastric outlet obstruction.  She appears to have some degree of ileus that may be r
elated to her surgery and/or her narcotic use.  I have encouraged her to decrease her narcotic use an
d I have reordered for her intravenous Tylenol, which helped her previously.  She is encouraged to in
crease her oral intake as tolerated.  She will ambulate regularly.  As soon as she is tolerating adeq
uate liquids, then she will be stable for discharge.  I expect this will be at the next 24-48 hours.

## 2018-11-10 NOTE — PRG
DATE OF SERVICE:  11/10/2018

 

SUBJECTIVE:  She has minimal pain this morning.  Her bowel sounds are active.  She is tolerating shira
r liquids well.

 

OBJECTIVE:

VITAL SIGNS:  Temperature 97.7, pulse 93, blood pressure 102/68.

GENERAL:  She is in no acute distress.

LUNGS:  Clear to auscultation bilaterally.

HEART:  Regular rate and rhythm.

ABDOMEN:  Mildly tenderness diffusely without guarding.  Bowel sounds are present.

EXTREMITIES:  No lower extremity edema.

 

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  Status
 post surgical revision with already improved symptoms.

 

RECOMMENDATIONS:  Postoperative care per General Surgery.  She will advance her diet is recommended b
y General Surgery.

## 2018-11-10 NOTE — PDOC.PN
- Subjective


Encounter Start Date: 11/10/18


Encounter Start Time: 07:50





pt is doing well, tolerating clear liquid diet, still has drain in place, her 

pain is controlled





- Objective


Resuscitation Status: 


 











Resuscitation Status           FULL:Full Resuscitation














MAR Reviewed: Yes


Vital Signs & Weight: 


 Vital Signs (12 hours)











  Temp Pulse Resp BP Pulse Ox


 


 11/10/18 08:20  97.7 F  93  16  102/68  96


 


 11/10/18 04:00  98.8 F  98  18  102/69  97


 


 11/10/18 00:00  99.4 F  97  16  109/77  96


 


 11/09/18 23:59  97.8 F  97  16  109/77  96








 Weight











Admit Weight                   126 lb 12.253 oz


 


Weight                         126 lb 12.253 oz














I&O: 


 











 11/09/18 11/10/18 11/11/18





 06:59 06:59 06:59


 


Intake Total 2125 3595 


 


Output Total 70 235 


 


Balance 2055 3360 











Result Diagrams: 


 11/10/18 04:32





 11/09/18 05:11





Phys Exam





- Physical Examination


Constitutional: NAD


HEENT: PERRLA, moist MMs, sclera anicteric


Neck: no JVD, supple


Respiratory: no wheezing, no rales, no rhonchi


Cardiovascular: RRR, no significant murmur, no rub


Gastrointestinal: soft, non-tender, no distention, positive bowel sounds


drain+


Musculoskeletal: no edema, pulses present


Neurological: non-focal, normal sensation, moves all 4 limbs


Psychiatric: normal affect, A&O x 3


Skin: no rash, normal turgor





Dx/Plan


(1) Epigastric abdominal pain


Code(s): R10.13 - EPIGASTRIC PAIN   Status: Acute   





(2) Gastric distention


Code(s): K31.89 - OTHER DISEASES OF STOMACH AND DUODENUM   Status: Acute   





(3) Gastric outlet obstruction


Code(s): K31.1 - ADULT HYPERTROPHIC PYLORIC STENOSIS   Status: Acute   





(4) Dehydration


Code(s): E86.0 - DEHYDRATION   Status: Resolved   





(5) Metabolic alkalosis


Code(s): E87.3 - ALKALOSIS   Status: Acute   





(6) Nausea & vomiting


Code(s): R11.2 - NAUSEA WITH VOMITING, UNSPECIFIED   Status: Resolved   





(7) Anxiety and depression


Code(s): F41.9 - ANXIETY DISORDER, UNSPECIFIED; F32.9 - MAJOR DEPRESSIVE 

DISORDER, SINGLE EPISODE, UNSPECIFIED   Status: Chronic   





(8) Chronic low back pain


Code(s): M54.5 - LOW BACK PAIN; G89.29 - OTHER CHRONIC PAIN   Status: Chronic   





(9) H/O gastric bypass


Code(s): Z98.84 - BARIATRIC SURGERY STATUS   Status: Chronic   





(10) H/O peptic ulcer


Code(s): Z87.11 - PERSONAL HISTORY OF PEPTIC ULCER DISEASE   Status: Chronic   





(11) Hypothyroidism


Code(s): E03.9 - HYPOTHYROIDISM, UNSPECIFIED   Status: Chronic   





(12) Rheumatoid arthritis


Code(s): M06.9 - RHEUMATOID ARTHRITIS, UNSPECIFIED   Status: Chronic   





- Plan


cont current plan of care, plan discussed w/ family





* medication reviewed as below


* symptomatic treatment


* check ferritin


* restart selected home meds


* diet as per surgeon


* discussed with .








Review of Systems





- Review of Systems


ENT: negative: Ear Pain, Ear Discharge, Nose Pain, Nose Discharge, Nose 

Congestion, Mouth Pain, Mouth Swelling, Throat Pain, Throat Swelling, Other


Respiratory: negative: Cough, Dry, Shortness of Breath, Hemoptysis, SOB with 

Excertion, Pleuritic Pain, Sputum, Wheezing


Cardiovascular: negative: chest pain, palpitations, orthopnea, paroxysmal 

nocturnal dyspnea, edema, light headedness, other


Gastrointestinal: negative: Nausea, Vomiting, Abdominal Pain, Diarrhea, 

Constipation, Melena, Hematochezia, Other


Genitourinary: negative: Dysuria, Frequency, Incontinence, Hematuria, Retention

, Other


Musculoskeletal: negative: Neck Pain, Shoulder Pain, Arm Pain, Back Pain, Hand 

Pain, Leg Pain, Foot Pain, Other


Skin: negative: Rash, Lesions, David, Bruising, Other





- Medications/Allergies


Allergies/Adverse Reactions: 


 Allergies











Allergy/AdvReac Type Severity Reaction Status Date / Time


 


adhesive tape Allergy   Verified 12/27/17 10:32


 


Penicillins Allergy   Verified 12/27/17 10:32


 


Sulfa (Sulfonamide Allergy   Verified 12/27/17 10:32





Antibiotics)     











Medications: 


 Current Medications





Acetaminophen (Tylenol)  650 mg NV Q4H PRN


   PRN Reason: Headache/Fever/Mild Pain (1-3)


Acetaminophen (Tylenol)  500 mg PO Q6H PRN


   PRN Reason: Mild Pain (1-3)


Artificial Tears (Tears Naturale)  2 drop EA EYE PRN PRN


   PRN Reason: Dry Eyes


Bisacodyl (Dulcolax)  10 mg NV DAILYPRN PRN


   PRN Reason: Constipation


Enoxaparin Sodium (Lovenox)  40 mg SC 0900 [facility]


   Last Admin: 11/10/18 08:30 Dose:  40 mg


Escitalopram Oxalate (Lexapro)  20 mg PO HS [facility]


Guaifenesin (Robitussin Sf)  200 mg PO Q4H PRN


   PRN Reason: Cough


Hydralazine HCl (Apresoline)  10 mg SLOW IVP Q4H PRN


   PRN Reason: SBP Greater Than 170


Hydroxychloroquine Sulfate (Plaquenil)  200 mg PO HS [facility]


Acetaminophen 1,000 mg/ Device  100 mls @ 400 mls/hr IVPB Q6HR [facility]


   Stop: 11/11/18 18:01


   Last Admin: 11/10/18 06:12 Dose:  100 mls


Potassium Chloride/Dextrose/Sod Cl (D5 1/2 Ns W/20 Meq Kcl)  1,000 mls @ 100 mls

/hr IV .Q10H [facility]


   Last Admin: 11/10/18 08:44 Dose:  Not Given


Leflunomide (Arava)  10 mg PO DAILY [facility]


   Last Admin: 11/10/18 08:30 Dose:  Not Given


Levothyroxine Sodium (Synthroid)  25 mcg PO 0600 [facility]


Loperamide HCl (Imodium)  2 mg PO PRN PRN


   PRN Reason: Diarrhea/Loose Stools


Mineral Oil/White Petrolatum (Eucerin Cream)  0 gm TOP BIDPRN PRN


   PRN Reason: Dry Skin


Morphine Sulfate (Morphine)  2 mg SLOW IVP Q2H PRN


   PRN Reason: Pain


   Last Admin: 11/10/18 08:30 Dose:  2 mg


Morphine Sulfate (Morphine)  4 mg SLOW IVP Q2H PRN


   PRN Reason: Pain


Ondansetron HCl (Zofran)  4 mg IVP Q6H PRN


   PRN Reason: Nausea/Vomiting


   Last Admin: 11/07/18 12:25 Dose:  4 mg


Ondansetron HCl (Zofran Odt)  4 mg SL Q6H PRN


   PRN Reason: Nausea/Vomiting


Pantoprazole Sodium (Protonix)  40 mg IVP Q12HR [facility]


   Last Admin: 11/10/18 08:30 Dose:  40 mg


Senna/Docusate Sodium (Senokot S)  2 tab PO BID PRN


   PRN Reason: Constipation


Sodium Chloride (Flush - Normal Saline)  10 ml IVF Q12HR [facility]


   Last Admin: 11/10/18 08:31 Dose:  10 ml


Sodium Chloride (Flush - Normal Saline)  10 ml IVF PRN PRN


   PRN Reason: Saline Flush


Sodium Chloride (Ocean Nasal Spray 0.65%)  0 ml EA NARE QIDPRN PRN


   PRN Reason: Nasal Congestion


Throat Lozenges (Cepastat Lozenges)  1 yusra PO Q2H PRN


   PRN Reason: Sore Throat


Zolpidem Tartrate (Ambien)  5 mg PO HSPRN PRN


   PRN Reason: Insomnia

## 2018-11-10 NOTE — PRG
DATE OF SERVICE:  11/10/2018

 

SUBJECTIVE:  Ms. Mak is accordance postop day #2 status post laparoscopic revision of her gastrojej
unostomy with Dr. Gutierrez.  There were no acute overnight events.  Patient reports that she continues
 to tolerate clear liquids.  Pain remains uncontrolled and she has not been able to wean from IV anal
gesics.  She is ambulating.

 

OBJECTIVE:

VITAL SIGNS:  Temperature 97.7, pulse 93, respirations 16, O2 sat 96% on room air, blood pressure 102
/68.

GENERAL:  Resting in bed in no acute distress.

PULMONARY:  Normal work of breathing.  Symmetric rise.

CARDIOVASCULAR:  Regular rate and rhythm.

GASTROINTESTINAL:  Abdomen is soft with generalized tenderness.  No guarding or rigidity.  Bowel soun
ds remain hypoactive.  Surgical sites are clean, dry, and intact.  KELSEY drain with serosanguineous drai
nage.

MUSCULOSKELETAL:  Moves all extremities x4.

NEUROLOGIC:  No focal deficit noted.

 

LABORATORY FINDINGS:  WBC 6.7, hemoglobin 13.2, hematocrit 41.1, platelet count 294.

 

ASSESSMENT:

1.  Postop day #2 status post gastrojejunostomy revision.

2.  Acute pain.

 

PLAN:  Transition from IV to p.o. analgesics.  Patient was educated to try and refrain from use of IV
 narcotics as this may prolong time to return of bowel function.  Encourage incentive spirometry and 
pulmonary toileting.  Encourage mobility.  Plan of care was discussed with the patient and family at 
bedside and all questions were answered at the time of this dictation.  Patient was seen and evaluate
d with Dr. Espinoza.

## 2018-11-11 NOTE — PRG
DATE OF SERVICE:  11/11/2018

 

SUBJECTIVE:  Ms. Mak is healing well from her surgery.  She is tolerating clear liquids well and tr
ansitioning to full liquid diet now.  Her pain is well controlled.  She is passing flatus, but no sto
ol yet.

 

OBJECTIVE:

VITAL SIGNS:  Temperature 97.7, pulse 83, blood pressure 104/71.

GENERAL:  She is in no acute distress.  She is alert and oriented x3.

LUNGS:  Clear to auscultation bilaterally.

HEART:  Regular rate and rhythm without murmur.

ABDOMEN:  Tender diffusely without guarding.  Bowel sounds are present.

EXTREMITIES:  No lower extremity edema.

 

LABORATORY DATA:  White blood cell count 6.7, hemoglobin 13.2.  Her [facility] inflammatory bowel dis
ease serology came back and the antibody profile and genetic profile is suggestive of Crohn's disease
.

 

IMPRESSION:  Small bowel Crohn's disease.  She originally presented in 2015 with gastric outlet obstr
uction from nonhealing pyloric ulcer.  She, ultimately, required surgical treatment with antrectomy a
nd vagotomy and gastrojejunostomy.  She returned in 2018 with again a gastric outlet obstruction, now
 from stricturing of the small bowel at the anastomosis.  She required surgical revision of the anast
omosis and is healing from that surgery now.  Her [facility] inflammatory bowel disease serology is s
uggestive of Crohn's.  This would certainly tie her overall picture together given that the original 
cause of the ulcerations has not been determined otherwise.  She has been off NSAIDs and has been Hel
icobacter pylori negative.  She has also had inflammatory arthritis and erythema nodosum, which certa
inly could be explained by Crohn's disease.  She has had inadequate response of her joint symptoms to
 leflunomide and hydroxychloroquine.  I think she will benefit with all her symptoms with starting an
 anti-TNF.

 

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  We will recommend starting Humira 40 mg every 2 weeks, first to be lina
ded week 0 with 160 mg and week 2 with 80 mg and then 40 mg every 2 weeks.  We will initiate the proc
ess to the office for approval for the insurance.  HIV is nonreactive.  We will check antibody status
 to hepatitis A and hepatitis B and vaccinate if she is not immune.  She has received her flu shot th
is year.  Pneumonia vaccine is recommended now.  She will need to follow up with her primary care doc
tor to verify that her tetanus, diphtheria, and pertussis are up-to-date.  She can also receive the n
ew varicella vaccine; however, should not receive live virus vaccines at this time, as we are yovany buck to start anti-TNF.

## 2018-11-11 NOTE — OP
DATE OF PROCEDURE:  11/08/2018

 

PREOPERATIVE DIAGNOSIS:  Gastric outlet obstruction (at the prior gastrojejunostomy).

 

POSTOPERATIVE DIAGNOSIS:  Gastric outlet obstruction (at the prior gastrojejunostomy).

 

OPERATION PERFORMED:  Distal gastrectomy to include the prior gastrojejunostomy and creation of new g
astric outflow with new gastrojejunostomy.

 

SURGEON:  Mohinder Gutierrez M.D.

 

ANESTHESIA:  General endotracheal.

 

INDICATIONS:  The patient is a 40-year-old white female.  She previously had a vagotomy and antrectom
y with a Christiana-en-Y gastrojejunostomy.  She did well for about 2-1/2 years.  About 6 months ago, she b
graciela developing recurrent problems with nausea and vomiting.  She has been found to have a gastric ou
tlet obstruction at the gastrojejunostomy.  Revision of this anastomosis was recommended.

 

OPERATIVE PROCEDURE IN DETAIL:  Informed consent was obtained.  The patient taken to the operating ro
om where general endotracheal anesthesia obtained with the patient in supine position.  Abdomen was p
repped with ChloraPrep and draped in sterile fashion.  Local anesthetic was infiltrated and 5 mm inci
jin was created, one of the 5 mm previous laparoscopic port sites in the right upper quadrant.  Ivonne
ss needle was passed through this incision and pneumoperitoneum established using carbon dioxide up t
o a pressure of 15 mmHg. A 5 mm trocar port was passed through the same incision.  Laparoscopic camer
a was passed this port.  I thoroughly examined the abdomen.  She was noted to have scant adhesions.  
There were adhesions from omentum up to the abdominal wall at the site of a prior periumbilical herni
a repair with mesh.  The mesh was intact with no evidence of any recurrence at this site.  There were
 no significant adhesions involving the upper abdomen or the liver.

 

Under direct vision, I placed 3 more ports.  I placed two 5 mm left abdominal ports, an additional la
teral 5 mm right abdominal port.  I exchanged the paramedian right port for a 12 mm port.  I then mad
e a 5 mm epigastric incision that I used for placement of a Wisam retractor.  The left lobe of th
e liver was easily retracted.

 

I turned my attention to the area of the gastrojejunostomy.  I first assured appropriate anatomy.  I 
was able to identify the Christiana limb and trace this down to the jejunojejunostomy.  There is no dilatat
ion at this level.  There was thickening and scarring of the jejunum adjacent to the stomach.  There 
was also thickening of the distal stomach.  I first identified a segment of jejunum just distal to th
e anastomosis.  A mesenteric window was created and the small bowel was divided at this level with a 
single fire of the [place] stapler.  I then took down the mesentery of the jejunum extending towards 
the anastomosis in order to completely free the segment.  I then cleared some of the lesser curve of 
the stomach and some of the greater curve of the stomach.  I resected the distal stomach with 2 fires
 of the [place] stapler using gold loads of staples.  The specimen was removed through the 12 mm port
 site in the right abdomen.

 

I then created a gastrojejunostomy, an incision was created on the posterior aspect of the stomach ne
ar the staple line.  An enterotomy was created on the small intestine and the 60 mm [place] stapler w
as used to create an anastomosis.  To ensure adequate length and size of this anastomosis, I used to 
part of another fire a staple to extend the anastomosis.  I then closed the common enterotomy involvi
ng the stomach and small intestine with another fire of the [place] stapler, again using the gold lina
d.

 

The staple lines were inspected.  There were a couple areas of oozing that were quickly controlled wi
th electrocautery.  The area was irrigated and all irrigant was aspirated.  There was no evidence of 
bleeding or certainly any leak.  I then had anesthesia distended the stomach and the proximal bowel w
ith air by insufflating through the nasogastric tube.  The anastomosis was under water and there was 
no evidence of leak.  All irrigant was aspirated.  I then obtained a #19 round fluted drain, brought 
it out through the right lateral incision site where secured with a 3-0 nylon suture.  It was then pl
aced underneath the anastomosis and over into the left abdomen.

 

All ports and instruments removed under direct vision.  Pneumoperitoneum was carefully evacuated.  Qu
arter percent Marcaine with epinephrine was infiltrated at each port site.  Skin edges approximated w
ith 4-0 Monocryl subcuticular suture.  The fascia at the 12 mm site was closed with a figure-of-eight
 suture of 0 Vicryl using the GraNee needle before closure and the wound was extensively irrigated.  
There were no complications.  Blood loss was negligible throughout the operation.  She was taken to r
ecovery room in stable condition.

## 2018-11-11 NOTE — PRG
DATE OF SERVICE:  11/11/2018

 

SUBJECTIVE:  Nitza Mak is a 40-year-old female who is postop day #3 status post gastrojejunostomy
 revision with Dr. Gutierrez.  There are no acute overnight events.  The patient's pain continues to re
main uncontrolled that she has used multiple doses of IV pain medications since adjusting her pain re
gimen yesterday.  She is tolerating a clear liquid diet.  She is passing flatus, but has not had any 
bowel movements at this time.

 

OBJECTIVE:

VITAL SIGNS:  Temperature 97.9, pulse 83, respirations 16, O2 sat 97% on room air, blood pressure 104
/71.

GENERAL:  Young female in no acute distress, resting in bed.

PULMONARY:  Normal work of breathing.  Symmetric rise.

CARDIOVASCULAR:  Regular rate and rhythm.

GASTROINTESTINAL:  Abdomen is soft with generalized tenderness.  No guarding or rigidity.  Bowel soun
ds are active.  Surgical sites are clean, dry, and intact.  KELSEY drain with serosanguineous drainage 22
5 mL in 24 hours.

MUSCULOSKELETAL:  Moves all extremities x4.

NEUROLOGIC:  No focal deficit is noted.

 

ASSESSMENT:

1.  Postoperative day #3, status post gastrojejunostomy revision.

2.  Acute pain.

 

PLAN:  Increase pain regimen from Ultram only to [place].  Discontinue IV pain medications.  Increase d
iet to full liquid diet.  Encourage mobility, incentive spirometry and pulmonary toileting.  If patihafsa levine's pain is controlled, hopefully, she will be a candidate for discharge in the near future.  Plan o
f care was discussed with the patient and all questions were answered at the time of this dictation. 
 The patient was seen and evaluated with Dr. Espinoza.

## 2018-11-11 NOTE — PDOC.PN
- Subjective


Encounter Start Date: 11/11/18


Encounter Start Time: 07:50





Patient seen and examined. No new complaints. No overnight events





pain controlled, ambulatory





- Objective


Resuscitation Status: 


 











Resuscitation Status           FULL:Full Resuscitation














MAR Reviewed: Yes


Vital Signs & Weight: 


 Vital Signs (12 hours)











  Temp Pulse Resp BP Pulse Ox


 


 11/11/18 07:46  97.9 F  80  14  112/77  97


 


 11/11/18 04:16  97.9 F  78  18  119/83  98


 


 11/10/18 23:52  98.0 F  89  18  113/78  97








 Weight











Admit Weight                   126 lb 12.253 oz


 


Weight                         126 lb 12.253 oz














I&O: 


 











 11/10/18 11/11/18 11/12/18





 06:59 06:59 06:59


 


Intake Total 3595 3420 


 


Output Total 235 225 


 


Balance 3360 3195 











Result Diagrams: 


 11/10/18 04:32





 11/09/18 05:11





Phys Exam





- Physical Examination


Constitutional: NAD


HEENT: PERRLA, moist MMs, sclera anicteric


Neck: no JVD, supple


Respiratory: no wheezing, no rales, no rhonchi


Cardiovascular: RRR, no significant murmur, no rub


Gastrointestinal: soft, non-tender, no distention, positive bowel sounds


surgical site clean and healthy, drain+


Musculoskeletal: no edema, pulses present


Neurological: non-focal, normal sensation, moves all 4 limbs


Psychiatric: normal affect, A&O x 3


Skin: no rash, normal turgor





Dx/Plan


(1) Epigastric abdominal pain


Code(s): R10.13 - EPIGASTRIC PAIN   Status: Suspected   





(2) Gastric distention


Code(s): K31.89 - OTHER DISEASES OF STOMACH AND DUODENUM   Status: Resolved   





(3) Gastric outlet obstruction


Code(s): K31.1 - ADULT HYPERTROPHIC PYLORIC STENOSIS   Status: Acute   Comment: 

s/p surgery   





(4) Dehydration


Code(s): E86.0 - DEHYDRATION   Status: Resolved   





(5) Metabolic alkalosis


Code(s): E87.3 - ALKALOSIS   Status: Resolved   





(6) Nausea & vomiting


Code(s): R11.2 - NAUSEA WITH VOMITING, UNSPECIFIED   Status: Resolved   





(7) Anxiety and depression


Code(s): F41.9 - ANXIETY DISORDER, UNSPECIFIED; F32.9 - MAJOR DEPRESSIVE 

DISORDER, SINGLE EPISODE, UNSPECIFIED   Status: Chronic   





(8) Chronic low back pain


Code(s): M54.5 - LOW BACK PAIN; G89.29 - OTHER CHRONIC PAIN   Status: Chronic   





(9) H/O gastric bypass


Code(s): Z98.84 - BARIATRIC SURGERY STATUS   Status: Chronic   





(10) H/O peptic ulcer


Code(s): Z87.11 - PERSONAL HISTORY OF PEPTIC ULCER DISEASE   Status: Chronic   





(11) Hypothyroidism


Code(s): E03.9 - HYPOTHYROIDISM, UNSPECIFIED   Status: Chronic   





(12) Rheumatoid arthritis


Code(s): M06.9 - RHEUMATOID ARTHRITIS, UNSPECIFIED   Status: Chronic   





- Plan


cont current plan of care, plan discussed w/ family





* diet as per surgeon


* medication reviewed as below


* symptomatic treatment


* IBD test came back positive, await pathology report from surgery, GI on case


* ambulate as tolerated


* pain controlled.








Review of Systems





- Review of Systems


ENT: negative: Ear Pain, Ear Discharge, Nose Pain, Nose Discharge, Nose 

Congestion, Mouth Pain, Mouth Swelling, Throat Pain, Throat Swelling, Other


Respiratory: negative: Cough, Dry, Shortness of Breath, Hemoptysis, SOB with 

Excertion, Pleuritic Pain, Sputum, Wheezing


Cardiovascular: negative: chest pain, palpitations, orthopnea, paroxysmal 

nocturnal dyspnea, edema, light headedness, other


Gastrointestinal: negative: Nausea, Vomiting, Abdominal Pain, Diarrhea, 

Constipation, Melena, Hematochezia, Other


Genitourinary: negative: Dysuria, Frequency, Incontinence, Hematuria, Retention

, Other


Musculoskeletal: negative: Neck Pain, Shoulder Pain, Arm Pain, Back Pain, Hand 

Pain, Leg Pain, Foot Pain, Other


Skin: negative: Rash, Lesions, David, Bruising, Other





- Medications/Allergies


Allergies/Adverse Reactions: 


 Allergies











Allergy/AdvReac Type Severity Reaction Status Date / Time


 


adhesive tape Allergy   Verified 12/27/17 10:32


 


Penicillins Allergy   Verified 12/27/17 10:32


 


Sulfa (Sulfonamide Allergy   Verified 12/27/17 10:32





Antibiotics)     











Medications: 


 Current Medications





Acetaminophen (Tylenol)  1,000 mg PO Q6HR OREN


   Last Admin: 11/11/18 05:44 Dose:  1,000 mg


Artificial Tears (Tears Naturale)  2 drop EA EYE PRN PRN


   PRN Reason: Dry Eyes


Bisacodyl (Dulcolax)  10 mg PA DAILYPRN PRN


   PRN Reason: Constipation


Enoxaparin Sodium (Lovenox)  40 mg SC 0900 [facility]


   Last Admin: 11/11/18 07:49 Dose:  40 mg


Escitalopram Oxalate (Lexapro)  20 mg PO [facility]


   Last Admin: 11/10/18 21:04 Dose:  20 mg


Guaifenesin (Robitussin Sf)  200 mg PO Q4H PRN


   PRN Reason: Cough


Hydralazine HCl (Apresoline)  10 mg SLOW IVP Q4H PRN


   PRN Reason: SBP Greater Than 170


Hydroxychloroquine Sulfate (Plaquenil)  200 mg PO HS [facility]


   Last Admin: 11/10/18 21:05 Dose:  Not Given


Potassium Chloride/Dextrose/Sod Cl (D5 1/2 Ns W/20 Meq Kcl)  1,000 mls @ 100 mls

/hr IV .Q10H [facility]


   Last Admin: 11/10/18 23:51 Dose:  1,000 mls


Leflunomide (Arava)  10 mg PO DAILY [facility]


   Last Admin: 11/11/18 07:50 Dose:  Not Given


Levothyroxine Sodium (Synthroid)  25 mcg PO 0600 [facility]


   Last Admin: 11/11/18 05:45 Dose:  25 mcg


Loperamide HCl (Imodium)  2 mg PO PRN PRN


   PRN Reason: Diarrhea/Loose Stools


Mineral Oil/White Petrolatum (Eucerin Cream)  0 gm TOP BIDPRN PRN


   PRN Reason: Dry Skin


Morphine Sulfate (Morphine)  2 mg SLOW IVP Q2H PRN


   PRN Reason: Pain


   Last Admin: 11/11/18 07:20 Dose:  2 mg


Morphine Sulfate (Morphine)  4 mg SLOW IVP Q2H PRN


   PRN Reason: Pain


   Last Admin: 11/10/18 13:24 Dose:  2 mg


Ondansetron HCl (Zofran)  4 mg IVP Q6H PRN


   PRN Reason: Nausea/Vomiting


   Last Admin: 11/07/18 12:25 Dose:  4 mg


Ondansetron HCl (Zofran Odt)  4 mg SL Q6H PRN


   PRN Reason: Nausea/Vomiting


Pantoprazole Sodium (Protonix)  40 mg IVP Q12HR [facility]


   Last Admin: 11/11/18 07:50 Dose:  40 mg


Senna/Docusate Sodium (Senokot S)  2 tab PO BID PRN


   PRN Reason: Constipation


Sodium Chloride (Flush - Normal Saline)  10 ml IVF Q12HR [facility]


   Last Admin: 11/10/18 21:05 Dose:  Not Given


Sodium Chloride (Flush - Normal Saline)  10 ml IVF PRN PRN


   PRN Reason: Saline Flush


Sodium Chloride ([place] Nasal Spray 0.65%)  0 ml EA NARE QIDPRN PRN


   PRN Reason: Nasal Congestion


Throat Lozenges (Cepastat Lozenges)  1 yusra PO Q2H PRN


   PRN Reason: Sore Throat


Tramadol HCl (Ultram)  50 mg PO Q6HR [facility]


   Last Admin: 11/11/18 05:41 Dose:  50 mg


Tramadol HCl (Ultram)  50 mg PO Q6H PRN


   PRN Reason: Breakthrough Pain


   Last Admin: 11/11/18 05:42 Dose:  50 mg


Zolpidem Tartrate (Ambien)  5 mg PO HSPRN PRN


   PRN Reason: Insomnia

## 2018-11-12 NOTE — PDOC.PN
- Subjective


Encounter Start Date: 11/12/18


Encounter Start Time: 08:30





pt has blister formation at surgical site, tolerating diet





- Objective


Resuscitation Status: 


 











Resuscitation Status           FULL:Full Resuscitation














MAR Reviewed: Yes


Vital Signs & Weight: 


 Vital Signs (12 hours)











  Temp Pulse Resp BP BP Pulse Ox


 


 11/12/18 08:37       96


 


 11/12/18 07:38  98.1 F  80  16   110/77  96


 


 11/12/18 03:42  98 F  75  16  100/66   99


 


 11/12/18 00:14  98.2 F  80  16  105/72   97








 Weight











Admit Weight                   126 lb 12.253 oz


 


Weight                         126 lb 12.253 oz














I&O: 


 











 11/11/18 11/12/18 11/13/18





 06:59 06:59 06:59


 


Intake Total 3420 590 270


 


Output Total 225 235 90


 


Balance 3195 355 180











Result Diagrams: 


 11/12/18 04:38





 11/12/18 04:38





Phys Exam





- Physical Examination


Constitutional: NAD


HEENT: PERRLA, moist MMs, sclera anicteric


Neck: no JVD, supple


Respiratory: no wheezing, no rales, no rhonchi


Cardiovascular: RRR, no significant murmur, no rub


Gastrointestinal: soft, non-tender, no distention, positive bowel sounds


blister at surgical site as well as surrounding area


Musculoskeletal: no edema, pulses present


Neurological: non-focal, normal sensation, moves all 4 limbs


Lymphatic: no nodes


Psychiatric: normal affect, A&O x 3


Skin: no rash, normal turgor





Dx/Plan


(1) Epigastric abdominal pain


Code(s): R10.13 - EPIGASTRIC PAIN   Status: Suspected   





(2) Gastric distention


Code(s): K31.89 - OTHER DISEASES OF STOMACH AND DUODENUM   Status: Resolved   





(3) Gastric outlet obstruction


Code(s): K31.1 - ADULT HYPERTROPHIC PYLORIC STENOSIS   Status: Acute   Comment: 

s/p surgery   





(4) Dehydration


Code(s): E86.0 - DEHYDRATION   Status: Resolved   





(5) Metabolic alkalosis


Code(s): E87.3 - ALKALOSIS   Status: Resolved   





(6) Nausea & vomiting


Code(s): R11.2 - NAUSEA WITH VOMITING, UNSPECIFIED   Status: Resolved   





(7) Anxiety and depression


Code(s): F41.9 - ANXIETY DISORDER, UNSPECIFIED; F32.9 - MAJOR DEPRESSIVE 

DISORDER, SINGLE EPISODE, UNSPECIFIED   Status: Chronic   





(8) Chronic low back pain


Code(s): M54.5 - LOW BACK PAIN; G89.29 - OTHER CHRONIC PAIN   Status: Chronic   





(9) H/O gastric bypass


Code(s): Z98.84 - BARIATRIC SURGERY STATUS   Status: Chronic   





(10) H/O peptic ulcer


Code(s): Z87.11 - PERSONAL HISTORY OF PEPTIC ULCER DISEASE   Status: Chronic   





(11) Hypothyroidism


Code(s): E03.9 - HYPOTHYROIDISM, UNSPECIFIED   Status: Chronic   





(12) Rheumatoid arthritis


Code(s): M06.9 - RHEUMATOID ARTHRITIS, UNSPECIFIED   Status: Chronic   





(13) Crohns disease


Code(s): K50.90 - CROHN'S DISEASE, UNSPECIFIED, WITHOUT COMPLICATIONS   Status: 

Acute   





(14) Acute bullous dermatitis


Code(s): L13.9 - BULLOUS DISORDER, UNSPECIFIED   Status: Acute   





- Plan


cont current plan of care, plan discussed w/ family





* pt has blister but to diagnose biopsy may need but seems like she has 

pathergy and should be avoided


* medication reviewed as below


* symptomatic treatment


* discharge when surgeon ok.








Review of Systems





- Review of Systems


ENT: negative: Ear Pain, Ear Discharge, Nose Pain, Nose Discharge, Nose 

Congestion, Mouth Pain, Mouth Swelling, Throat Pain, Throat Swelling, Other


Respiratory: negative: Cough, Dry, Shortness of Breath, Hemoptysis, SOB with 

Excertion, Pleuritic Pain, Sputum, Wheezing


Cardiovascular: negative: chest pain, palpitations, orthopnea, paroxysmal 

nocturnal dyspnea, edema, light headedness, other


Gastrointestinal: negative: Nausea, Vomiting, Abdominal Pain, Diarrhea, 

Constipation, Melena, Hematochezia, Other


Genitourinary: negative: Dysuria, Frequency, Incontinence, Hematuria, Retention

, Other


Musculoskeletal: negative: Neck Pain, Shoulder Pain, Arm Pain, Back Pain, Hand 

Pain, Leg Pain, Foot Pain, Other





- Medications/Allergies


Allergies/Adverse Reactions: 


 Allergies











Allergy/AdvReac Type Severity Reaction Status Date / Time


 


adhesive tape Allergy   Verified 12/27/17 10:32


 


Penicillins Allergy   Verified 12/27/17 10:32


 


Sulfa (Sulfonamide Allergy   Verified 12/27/17 10:32





Antibiotics)     











Medications: 


 Current Medications





Acetaminophen (Tylenol)  650 mg PO Q6HR [facility]


   Last Admin: 11/12/18 08:37 Dose:  650 mg


Hydrocodone Bitart/Acetaminophen (Norco 7.5/325)  1 tab PO Q6HR PRN


   PRN Reason: Severe Pain (7-10)


   Last Admin: 11/11/18 20:58 Dose:  1 tab


Artificial Tears (Tears Naturale)  2 drop EA EYE PRN PRN


   PRN Reason: Dry Eyes


Bisacodyl (Dulcolax)  10 mg MA DAILYPRN PRN


   PRN Reason: Constipation


Diphenhydramine HCl (Benadryl)  25 mg PO Q6H PRN


   PRN Reason: Itching


   Last Admin: 11/12/18 02:04 Dose:  25 mg


Enoxaparin Sodium (Lovenox)  40 mg SC 0900 [facility]


   Last Admin: 11/12/18 08:37 Dose:  40 mg


Escitalopram Oxalate (Lexapro)  20 mg PO HS [facility]


   Last Admin: 11/11/18 20:58 Dose:  20 mg


Guaifenesin (Robitussin Sf)  200 mg PO Q4H PRN


   PRN Reason: Cough


Hydralazine HCl (Apresoline)  10 mg SLOW IVP Q4H PRN


   PRN Reason: SBP Greater Than 170


Hydroxychloroquine Sulfate (Plaquenil)  200 mg PO HS [facility]


   Last Admin: 11/11/18 20:58 Dose:  Not Given


Leflunomide (Arava)  10 mg PO DAILY [facility]


   Last Admin: 11/12/18 08:40 Dose:  Not Given


Levothyroxine Sodium (Synthroid)  25 mcg PO 0600 [facility]


   Last Admin: 11/12/18 05:26 Dose:  25 mcg


Loperamide HCl (Imodium)  2 mg PO PRN PRN


   PRN Reason: Diarrhea/Loose Stools


Mineral Oil/White Petrolatum (Eucerin Cream)  0 gm TOP BIDPRN PRN


   PRN Reason: Dry Skin


Ondansetron HCl (Zofran)  4 mg IVP Q6H PRN


   PRN Reason: Nausea/Vomiting


   Last Admin: 11/07/18 12:25 Dose:  4 mg


Ondansetron HCl (Zofran Odt)  4 mg SL Q6H PRN


   PRN Reason: Nausea/Vomiting


Pantoprazole Sodium (Protonix)  40 mg IVP Q12HR [facility]


   Last Admin: 11/12/18 08:37 Dose:  40 mg


Senna/Docusate Sodium (Senokot S)  2 tab PO BID PRN


   PRN Reason: Constipation


Sodium Chloride (Flush - Normal Saline)  10 ml IVF Q12HR [facility]


   Last Admin: 11/12/18 08:38 Dose:  10 ml


Sodium Chloride (Flush - Normal Saline)  10 ml IVF PRN PRN


   PRN Reason: Saline Flush


Sodium Chloride ([place]cean Nasal Spray 0.65%)  0 ml EA NARE QIDPRN PRN


   PRN Reason: Nasal Congestion


Throat Lozenges (Cepastat Lozenges)  1 yusra PO Q2H PRN


   PRN Reason: Sore Throat


Tramadol HCl (Ultram)  100 mg PO Q6HR [facility]


   Last Admin: 11/12/18 08:38 Dose:  100 mg


Zolpidem Tartrate (Ambien)  5 mg PO HSPRN PRN


   PRN Reason: Insomnia

## 2018-11-12 NOTE — DIS
DATE OF ADMISSION:  11/05/2018

 

DATE OF DISCHARGE:  11/12/2018

 

PRIMARY CARE PHYSICIAN:  Dr. Kerwin Gann.

 

DISCHARGE DISPOSITION:  Home.

 

PRIMARY DISCHARGE DIAGNOSES:

1.  Nausea, vomiting, epigastric abdominal pain due to gastric outlet 
obstruction, resolved.

2.  Metabolic alkalosis due to recurrent nausea and vomiting.

3.  Gastric distention due to problem #1.

4.  Dehydration, corrected.

5.  Bullous dermatitis at surgical site.

6.  Crohn's disease.

7.  Status post gastrojejunostomy revision.

 

SECONDARY DISCHARGE DIAGNOSES:  History of gastric bypass, history of peptic 
ulcer, chronic low back pain, history of erythema nodosum, anxiety and 
depression, rheumatoid arthritis, hypothyroidism.

 

PRIMARY PROCEDURE/OPERATION:

1.  During this admission, patient required NG tube placement.

2.  The patient underwent gastrojejunostomy revision by Dr. Gutierrez.

 

RADIOLOGICAL INVESTIGATION:  Abdomen and pelvis CT scan showed gastric 
distention, which was related with a gastric outlet obstruction.  The patient 
had serial abdomen x-ray.

 

SIGNIFICANT LABORATORY DATA:  WBC 7.1, hemoglobin 13.0, platelets 306.  ESR 11, 
CRP 1.03.  Sodium 137, potassium 3.9, BUN 6, creatinine 0.67.  LFT normal, 
lipase 13.  Urinalysis unremarkable.  Pregnancy test negative.  Hepatitis 
profile negative.  Serology testing for IBD was positive and pattern consistent 
with Crohn's disease.  Stool for guaiac negative.

 

DISCHARGE MEDICATIONS:  The patient will continue all her previous medication, 
tramadol 50 mg 1 or 2 tablets q.6 hourly p.r.n., Lexapro 20 mg p.o. at bedtime, 
Plaquenil 200 mg p.o. at bedtime, leflunomide 10 mg p.o. daily, levothyroxine 
25 mcg p.o. daily, Protonix 40 mg p.o. b.i.d.

 

CONTRAINDICATIONS:  None.

 

CODE STATUS:  FULL CODE.

 

INPATIENT CONSULTANTS:  Dr. Bello was following while in hospital.  Dr. Gutierrez 
was following while in hospital.

 

TEST RESULTS PENDING ON DISCHARGE:  TB QuantiFERON Gold test.

 

DISCHARGE PLAN:  Post hospital, the patient will follow up with Dr. Bello and 
Dr. Gutierrez and as instructed.  The patient will make appointment with primary 
care physician.

 

HOSPITAL COURSE:  This is a 40-year-old female, who has previous history of 
peptic ulcer disease and she had a gastric bypass.  She had one time surgery 
for surgical site stricture, and at this time, again she was admitted for nausea
, vomiting, and epigastric abdominal pain, which was related with gastric 
outlet obstruction secondary to the stricture at the gastrojejunostomy site.  
General Surgery was following while in hospital and they recommended surgery.  
Patient underwent a gastrojejunostomy revision.  After surgery, she had drain 
in place.  Before surgery, patient was treated with IV fluid, NG tube with low 
intermittent suction, and abnormal electrolytes corrected, and her dehydration 
was corrected.  After surgery, patient had a surgical site bullous lesion, 
which we are suspecting from pathology.  This patient may need further 
evaluation with a skin biopsy if needed as an outpatient basis, but we are 
trying to avoid, because we are suspecting pathologic response.

 

Dr. Bello was following and he sent IBD serology testing and that came back 
positive and pattern consistent with IBD Crohn's disease.  Retrospectively 
patient's presentation and prospectively based on this testing, Dr. Bello was 
thinking that this patient might have Crohn's disease and that is why TB 
QuantiFERON test was sent and he is considering outpatient basis Humira therapy.

 

After surgery, patient was started on diet and diet was advanced.  The patient 
was tolerating diet very well.  Her pain was well controlled.  Dr. Gutierrez 
cleared her for discharge today.

 

The patient is seen and examined at bedside today.  Plan of care discussed with 
the patient's .  Please see my progress note from today for further 
detail.



Total time spent on discharge day 31 minutes

 

MTDD